# Patient Record
(demographics unavailable — no encounter records)

---

## 2025-06-03 NOTE — HEALTH RISK ASSESSMENT
[0] : 2) Feeling down, depressed, or hopeless: Not at all (0) [PHQ-2 Negative - No further assessment needed] : PHQ-2 Negative - No further assessment needed [Never] : Never [No] : No [No falls in past year] : Patient reported no falls in the past year [[id number]] : 0

## 2025-06-03 NOTE — REVIEW OF SYSTEMS
[Fever] : no fever [Chills] : no chills [Night Sweats] : no night sweats [Recent Change In Weight] : ~T no recent weight change [Discharge] : no discharge [Pain] : no pain [Vision Problems] : no vision problems [Earache] : no earache [Hearing Loss] : no hearing loss [Nasal Discharge] : no nasal discharge [Sore Throat] : no sore throat [Chest Pain] : no chest pain [Palpitations] : no palpitations [Leg Claudication] : no leg claudication [Lower Ext Edema] : no lower extremity edema [Orthopnea] : no orthopnea [Paroxysmal Nocturnal Dyspnea] : no paroxysmal nocturnal dyspnea [Shortness Of Breath] : no shortness of breath [Wheezing] : no wheezing [Cough] : no cough [Dyspnea on Exertion] : no dyspnea on exertion [Abdominal Pain] : no abdominal pain [Nausea] : no nausea [Vomiting] : no vomiting [Heartburn] : no heartburn [Dysuria] : no dysuria [Incontinence] : no incontinence [Hematuria] : no hematuria [Frequency] : no frequency [Joint Pain] : no joint pain [Joint Stiffness] : no joint stiffness [Back Pain] : no back pain [Itching] : no itching [Skin Rash] : no skin rash [Headache] : no headache [Dizziness] : no dizziness [Fainting] : no fainting [Memory Loss] : no memory loss [Suicidal] : not suicidal [Insomnia] : no insomnia [Anxiety] : no anxiety [Easy Bruising] : no easy bruising [Swollen Glands] : no swollen glands

## 2025-06-03 NOTE — END OF VISIT
[] : Resident [FreeTextEntry3] : # Travel- traveling to [facility] in 2d to provide medical care. Got typhoid, polio, yellow fever, HepA/B vaccines thru travel clinic, here for mpox vaccine (no hx mpox vaccination/disease). Has malaria ppx and says had titers checked showing MMR immune. Disc mpox risk in [facility], shared decision making to give 1st dose of vaccine today, pt understands does not provide full protection # DINA- thought to be 2/2 heavy periods. Currently on daily OTC iron supplement. No hx RBC transfusions or risk factors for B12 def. Will check CBC w iron studies. Disc workup and mgmt options for heavy periods, pt to consider once returned from travels # Soc Hx- works as ER physician. No alcohol/tobacco/illicit drugs. Monogamous w one F partner, had neg HIV and other STI testing prev, declines repeat aside from HCV (never screened before) # HCM- Due for cervical CA screen, GYN referral given. Covid/flu/tdap/HPV UTD. Check a1c and lipids.

## 2025-06-03 NOTE — ASSESSMENT
[Vaccines Reviewed] : Immunizations reviewed today. Please see immunization details in the vaccine log within the immunization flowsheet. 
yes

## 2025-06-03 NOTE — PHYSICAL EXAM
[No Acute Distress] : no acute distress [Well Nourished] : well nourished [Well Developed] : well developed [Well-Appearing] : well-appearing [Normal Sclera/Conjunctiva] : normal sclera/conjunctiva [PERRL] : pupils equal round and reactive to light [EOMI] : extraocular movements intact [Normal Outer Ear/Nose] : the outer ears and nose were normal in appearance [Normal Oropharynx] : the oropharynx was normal [No Lymphadenopathy] : no lymphadenopathy [Supple] : supple [No Accessory Muscle Use] : no accessory muscle use [Clear to Auscultation] : lungs were clear to auscultation bilaterally [Regular Rhythm] : with a regular rhythm [Normal S1, S2] : normal S1 and S2 [No Murmur] : no murmur heard [No Edema] : there was no peripheral edema [Non Tender] : non-tender [Non-distended] : non-distended [Normal Posterior Cervical Nodes] : no posterior cervical lymphadenopathy [Normal Anterior Cervical Nodes] : no anterior cervical lymphadenopathy [No CVA Tenderness] : no CVA  tenderness [No Spinal Tenderness] : no spinal tenderness [No Joint Swelling] : no joint swelling [No Rash] : no rash [No Focal Deficits] : no focal deficits [Normal Gait] : normal gait [Normal Affect] : the affect was normal [Normal Insight/Judgement] : insight and judgment were intact

## 2025-06-03 NOTE — HISTORY OF PRESENT ILLNESS
[FreeTextEntry1] : establishing care PHQ2:0 [de-identified] : 38 y.o F with DINA  presents to establish care and obtain Monkeypox vaccine before travel to [facility].  Patient reports hx of DINA, never required tranfusion. She has heavy menstruation, no other sites of bleeding: hematochezia, hematuria, or melena.  She works as ED physician. She is traveling to [facility] next week, will be staying there 2 months to provide medical care. She was seen in travel clinic and completed all the work up except the Monkeypox vaccine. She obtained yellow fever, typhoid, polio, Hep B, Hep A vaccines, MMR titers were positive, For Malaria she will be taking Malarone for Malaria as per travel clinc recommendation. Rabies vaccine was also discussed but patient deferrs as she will not be working with animals, and understand the risks, and to obtain vaccines and treatment if she comes in contact with animals   SHe lives with her partner, monogamous with female partner, no hx of STI in the past. She deferrs STI testing today